# Patient Record
Sex: MALE | Race: WHITE | NOT HISPANIC OR LATINO | Employment: FULL TIME | URBAN - METROPOLITAN AREA
[De-identification: names, ages, dates, MRNs, and addresses within clinical notes are randomized per-mention and may not be internally consistent; named-entity substitution may affect disease eponyms.]

---

## 2019-02-20 ENCOUNTER — HOSPITAL ENCOUNTER (OUTPATIENT)
Dept: MRI IMAGING | Facility: HOSPITAL | Age: 54
Discharge: HOME | End: 2019-02-20

## 2019-02-22 ENCOUNTER — HOSPITAL ENCOUNTER (OUTPATIENT)
Dept: OTHER | Facility: HOSPITAL | Age: 54
Discharge: HOME | End: 2019-02-22

## 2019-09-03 ENCOUNTER — OFFICE VISIT CONVERTED (OUTPATIENT)
Dept: ORTHOPEDIC SURGERY | Facility: CLINIC | Age: 54
End: 2019-09-03
Attending: ORTHOPAEDIC SURGERY

## 2021-05-15 VITALS — BODY MASS INDEX: 35.47 KG/M2 | HEIGHT: 75 IN | WEIGHT: 285.25 LBS | HEART RATE: 77 BPM | OXYGEN SATURATION: 98 %

## 2022-07-20 PROBLEM — R07.2 CHEST PAIN, PRECORDIAL: Status: ACTIVE | Noted: 2022-07-20

## 2022-07-20 NOTE — PROGRESS NOTES
"Chief Complaint  Chest Pain (Left arm going numb - getting worse last couple of weeks)      History of Present Illness  Medhat Stevens presents to [facility] CARDIOLOGY  Patient is a 57-year-old who is developed some left-sided chest pain in the last 6 months a lot of times occurring with laying in his recliner the pain is associate with some left arm numbness.  The patient does report worsening with inspiration sometimes with these episodes the pain is sharp in quality.  Patient denies any change in his exercise capacity at work or change in his functional status.  The patient reports about 8-10 episodes of chest pain in the last 6 months.  He denies any PND orthopnea lower extremity edema.  History reviewed. No pertinent past medical history.      Current Outpatient Medications:   •  fentaNYL (DURAGESIC) 25 MCG/HR patch, APPLY 1 PATCH TOPICALLY TO THE SKIN EVERY 48 HOURS, Disp: , Rfl:     There are no discontinued medications.  No Known Allergies     Social History     Tobacco Use   • Smoking status: Never Smoker   • Smokeless tobacco: Never Used   Vaping Use   • Vaping Use: Never used   Substance Use Topics   • Alcohol use: Not Currently     Comment: Years ago   • Drug use: Never       History reviewed. No pertinent family history.     Objective     /80   Pulse 70   Ht 190.5 cm (75\")   Wt 129 kg (285 lb)   BMI 35.62 kg/m²       Physical Exam    General Appearance:   · no acute distress  · Alert and oriented x3  HENT:   · lips not cyanotic  · Atraumatic  Neck:  · No jvd   · supple  Respiratory:  · no respiratory distress  · normal breath sounds  · no rales  Cardiovascular:  · Regular rate and rhythm  · no S3, no S4   · no murmur  · no rub  Extremities  · No cyanosis  · lower extremity edema: none    Skin:   · warm, dry  · No rashes    Result Review :     No results found for: PROBNP           ECG 12 Lead    Date/Time: 7/26/2022 10:50 AM  Performed by: Jamie Cox MD  Authorized by: " Jamie Cox MD   Comparison: not compared with previous ECG   Rhythm: sinus rhythm  Conduction: right bundle branch block           No results found for this or any previous visit.             The ASCVD Risk score ([place] MCKENZIE Jr., et al., 2013) failed to calculate for the following reasons:    Cannot find a previous HDL lab    Cannot find a previous total cholesterol lab     Diagnoses and all orders for this visit:    1. Chest pain, precordial (Primary)  Assessment & Plan:  Patient with new onset of nonexertional chest discomfort in the last few months differential does include muscle skeletal, pleuritis, or possible CAD given his poor exertional capacity due to his muscle skeletal issues will recommend a noninvasive stress test and echocardiogram if no ischemic issues would favor musculoskeletal cause.  In addition we will check a lipid panel to further assess his baseline risk factors    Orders:  -     Adult Transthoracic Echo Complete W/ Cont if Necessary Per Protocol; Future  -     Stress Test With Myocardial Perfusion One Day; Future  -     Lipid Panel; Future  -     Hepatic Function Panel; Future    Other orders  -     ECG 12 Lead          Follow Up     Return in about 6 months (around 1/26/2023).          Patient was given instructions and counseling regarding his condition or for health maintenance advice. Please see specific information pulled into the AVS if appropriate.

## 2022-07-26 ENCOUNTER — PATIENT ROUNDING (BHMG ONLY) (OUTPATIENT)
Dept: CARDIOLOGY | Facility: CLINIC | Age: 57
End: 2022-07-26

## 2022-07-26 ENCOUNTER — OFFICE VISIT (OUTPATIENT)
Dept: CARDIOLOGY | Facility: CLINIC | Age: 57
End: 2022-07-26

## 2022-07-26 VITALS
HEART RATE: 70 BPM | WEIGHT: 285 LBS | DIASTOLIC BLOOD PRESSURE: 80 MMHG | BODY MASS INDEX: 35.43 KG/M2 | SYSTOLIC BLOOD PRESSURE: 134 MMHG | HEIGHT: 75 IN

## 2022-07-26 DIAGNOSIS — R07.2 CHEST PAIN, PRECORDIAL: Primary | ICD-10-CM

## 2022-07-26 PROCEDURE — 99203 OFFICE O/P NEW LOW 30 MIN: CPT | Performed by: INTERNAL MEDICINE

## 2022-07-26 PROCEDURE — 93000 ELECTROCARDIOGRAM COMPLETE: CPT | Performed by: INTERNAL MEDICINE

## 2022-07-26 RX ORDER — FENTANYL 25 UG/H
PATCH TRANSDERMAL
COMMUNITY
Start: 2022-07-05

## 2022-07-26 NOTE — ASSESSMENT & PLAN NOTE
Patient with new onset of nonexertional chest discomfort in the last few months differential does include muscle skeletal, pleuritis, or possible CAD given his poor exertional capacity due to his muscle skeletal issues will recommend a noninvasive stress test and echocardiogram if no ischemic issues would favor musculoskeletal cause.  In addition we will check a lipid panel to further assess his baseline risk factors

## 2022-07-26 NOTE — PROGRESS NOTES
July 26, 2022    Hello, may I speak with Medhat Stevens?    My name is DARIELA       I am  with [facility] CARDIOLOG[facility]  [address] [place] [address] [phone].    Before we get started may I verify your date of birth? 1965    I am calling to officially welcome you to our practice and ask about your recent visit. Is this a good time to talk? yes    Tell me about your visit with us. What things went well?  EVERYTHING WENT WELL, EVERYTHING I EXPECTED TO HEAR IS WHAT I HEARD.        We're always looking for ways to make our patients' experiences even better. Do you have recommendations on ways we may improve?  yes, GET RID OF THE MASKS     Overall were you satisfied with your first visit to our practice? yes       I appreciate you taking the time to speak with me today. Is there anything else I can do for you? no      Thank you, and have a great day.

## 2022-07-29 ENCOUNTER — TELEPHONE (OUTPATIENT)
Dept: CARDIOLOGY | Facility: CLINIC | Age: 57
End: 2022-07-29

## 2022-08-25 ENCOUNTER — APPOINTMENT (OUTPATIENT)
Dept: CARDIOLOGY | Facility: HOSPITAL | Age: 57
End: 2022-08-25

## 2022-08-25 ENCOUNTER — APPOINTMENT (OUTPATIENT)
Dept: NUCLEAR MEDICINE | Facility: HOSPITAL | Age: 57
End: 2022-08-25

## 2023-05-24 NOTE — PROGRESS NOTES
Chief Complaint  Rectal bleeding and hematochezia    Medhat Stevens is a 58 y.o. male who presents to [facility] [place] on referral from Bryan Shafer MD for a gastroenterology evaluation of rectal bleeding and hematochezia       History of Present Illness  New patient presents to the office for rectal discharge, blood in stool, and GERD. He previously struggled with a lot of constipation resulting in blood in stool after straining. He has discontinued fentanyl patches and constipation has greatly improved. He has struggled with clear/mucus rectal discharge for several years, this improved when increasing water intake. He has heartburn daily, reports taking TUMS 5x/day. Denies nausea, vomiting, epigastric pain, and dysphagia.     Colonoscopy 04/18/2018 by Dr. Ji - small hyperplastic polyp in sigmoid colon and internal hemorrhoids. Repeat colonoscopy in 10 years     Past Medical History:   Diagnosis Date   • GERD (gastroesophageal reflux disease) 2014   • Irritable bowel syndrome 2020       Past Surgical History:   Procedure Laterality Date   • APPENDECTOMY  1982   • COLONOSCOPY  2018         Current Outpatient Medications:   •  HYDROcodone Bit-Homatrop MBr (HYCODAN) 5-1.5 MG tablet tablet, Take 1 tablet by mouth Every 6 (Six) Hours As Needed., Disp: , Rfl:   •  HYDROcodone-ibuprofen (VICOPROFEN) 7.5-200 MG per tablet, Take 1 tablet by mouth Every 8 (Eight) Hours As Needed for Moderate Pain., Disp: , Rfl:   •  Vitamin D, Cholecalciferol, (CHOLECALCIFEROL) 10 MCG (400 UNIT) tablet, Take 1 tablet by mouth Daily., Disp: , Rfl:   •  famotidine (Pepcid) 40 MG tablet, Take 1 tablet by mouth every night at bedtime., Disp: 90 tablet, Rfl: 1  •  fentaNYL (DURAGESIC) 25 MCG/HR patch, APPLY 1 PATCH TOPICALLY TO THE SKIN EVERY 48 HOURS, Disp: , Rfl:   •  PEG 3350-KCl-NaBcb-NaCl-NaSulf (Golytely) 227.1 g pack, Take 4,000 mL by mouth 1 (One) Time for 1 dose. Take as directed by the office  "for colon prep, Disp: 1 each, Rfl: 0     No Known Allergies    Family History   Problem Relation Age of Onset   • Colon cancer Neg Hx         Social History     Social History Narrative   • Not on file       Immunization:    There is no immunization history on file for this patient.     Objective     Vital Signs:   /82 (BP Location: Left arm, Patient Position: Sitting, Cuff Size: Adult)   Pulse 91   Ht 190.5 cm (75\")   Wt (!) 144 kg (316 lb 6.4 oz)   SpO2 94%   BMI 39.55 kg/m²       Physical Exam  Constitutional:       Appearance: Normal appearance.   HENT:      Head: Normocephalic.   Cardiovascular:      Rate and Rhythm: Normal rate and regular rhythm.      Heart sounds: Normal heart sounds.   Pulmonary:      Effort: Pulmonary effort is normal.      Breath sounds: Normal breath sounds.   Abdominal:      General: Bowel sounds are normal.      Palpations: Abdomen is soft.   Skin:     General: Skin is warm and dry.   Neurological:      Mental Status: He is alert and oriented to person, place, and time. Mental status is at baseline.   Psychiatric:         Mood and Affect: Mood normal.         Behavior: Behavior normal.         Thought Content: Thought content normal.         Judgment: Judgment normal.         Result Review :                 Assessment and Plan    Diagnoses and all orders for this visit:    1. Rectal discharge (Primary)    2. History of colon polyps    3. Blood in stool    4. Gastroesophageal reflux disease, unspecified whether esophagitis present    Other orders  -     famotidine (Pepcid) 40 MG tablet; Take 1 tablet by mouth every night at bedtime.  Dispense: 90 tablet; Refill: 1  -     PEG 3350-KCl-NaBcb-NaCl-NaSulf (Golytely) 227.1 g pack; Take 4,000 mL by mouth 1 (One) Time for 1 dose. Take as directed by the office for colon prep  Dispense: 1 each; Refill: 0    Add daily fiber supplement and increase water intake.  Start Pepcid 40mg daily.  Denies cardiopulmonary history. "   EGD/COLONOSCOPY Surgical Risk and Benefits: Possible risk/complications, benefits, and alternatives to surgical or invasive procedure have been explained to patient and/or legal guardian. Risks include bleeding, infection, and perforation. Patient has been evaluated and can tolerate anesthesia and/or sedation. Risk, benefits, and alternatives to anesthesia and sedation have been explained to patient and/or legal guardian.     Follow Up   No follow-ups on file.  Patient was given instructions and counseling regarding his condition or for health maintenance advice. Please see specific information pulled into the AVS if appropriate.

## 2023-05-25 ENCOUNTER — OFFICE VISIT (OUTPATIENT)
Dept: GASTROENTEROLOGY | Facility: CLINIC | Age: 58
End: 2023-05-25
Payer: COMMERCIAL

## 2023-05-25 ENCOUNTER — PREP FOR SURGERY (OUTPATIENT)
Dept: OTHER | Facility: HOSPITAL | Age: 58
End: 2023-05-25
Payer: COMMERCIAL

## 2023-05-25 VITALS
DIASTOLIC BLOOD PRESSURE: 82 MMHG | WEIGHT: 315 LBS | SYSTOLIC BLOOD PRESSURE: 147 MMHG | HEIGHT: 75 IN | HEART RATE: 91 BPM | OXYGEN SATURATION: 94 % | BODY MASS INDEX: 39.17 KG/M2

## 2023-05-25 DIAGNOSIS — R19.8 RECTAL DISCHARGE: Primary | ICD-10-CM

## 2023-05-25 DIAGNOSIS — Z86.010 HISTORY OF COLON POLYPS: ICD-10-CM

## 2023-05-25 DIAGNOSIS — K92.1 BLOOD IN STOOL: ICD-10-CM

## 2023-05-25 DIAGNOSIS — R07.2 CHEST PAIN, PRECORDIAL: ICD-10-CM

## 2023-05-25 DIAGNOSIS — K21.9 GASTROESOPHAGEAL REFLUX DISEASE, UNSPECIFIED WHETHER ESOPHAGITIS PRESENT: ICD-10-CM

## 2023-05-25 PROBLEM — Z86.0100 HISTORY OF COLON POLYPS: Status: ACTIVE | Noted: 2023-05-25

## 2023-05-25 RX ORDER — ERGOCALCIFEROL (VITAMIN D2) 10 MCG
400 TABLET ORAL DAILY
COMMUNITY

## 2023-05-25 RX ORDER — POLYETHYLENE GLYCOL 3350, SODIUM SULFATE ANHYDROUS, SODIUM BICARBONATE, SODIUM CHLORIDE, POTASSIUM CHLORIDE 227.1; 21.5; 6.36; 5.53; .754 G/L; G/L; G/L; G/L; G/L
4000 POWDER, FOR SOLUTION ORAL ONCE
Qty: 1 EACH | Refills: 0 | Status: SHIPPED | OUTPATIENT
Start: 2023-05-25 | End: 2023-05-25

## 2023-05-25 RX ORDER — FAMOTIDINE 40 MG/1
40 TABLET, FILM COATED ORAL
Qty: 90 TABLET | Refills: 1 | Status: SHIPPED | OUTPATIENT
Start: 2023-05-25

## 2023-05-25 RX ORDER — HYDROCODONE BITARTRATE AND IBUPROFEN 7.5; 2 MG/1; MG/1
1 TABLET, FILM COATED ORAL EVERY 8 HOURS PRN
COMMUNITY

## 2023-05-25 RX ORDER — HYDROCODONE BITARTRATE AND HOMATROPINE METHYLBROMIDE 5; 1.5 MG/1; MG/1
1 TABLET ORAL EVERY 6 HOURS PRN
COMMUNITY

## 2023-08-14 ENCOUNTER — TELEPHONE (OUTPATIENT)
Dept: GASTROENTEROLOGY | Facility: CLINIC | Age: 58
End: 2023-08-14
Payer: COMMERCIAL

## 2023-08-14 NOTE — TELEPHONE ENCOUNTER
Caller: FARHAD CORNEJO    Relationship: SELF    Best call back number: [phone]    What form or medical record are you requesting: NOTE FOR WORK    Who is requesting this form or medical record from you: WORK/SELF    How would you like to receive the form or medical records (pick-up, mail, fax):     Timeframe paperwork needed: AFTER PROCEDURE 08/16/23    Additional notes: PATIENT ASKED FOR A NOTE FOR HIS WORK BECAUSE HE WILL BE OUT THE NEXT TWO DAYS FOR PREP AND PROCEDURE. HE'S JUST REQUESTING TO RECEIVE THIS ON THE 16TH

## 2023-08-16 ENCOUNTER — TELEPHONE (OUTPATIENT)
Dept: GASTROENTEROLOGY | Facility: CLINIC | Age: 58
End: 2023-08-16
Payer: COMMERCIAL

## 2023-08-16 NOTE — TELEPHONE ENCOUNTER
Regarding egd/colonoscopy on 8/16/2023 ordered by JAY Rubi on 5/25/2023    Per email from ENDO- pt no showed

## 2023-08-17 NOTE — TELEPHONE ENCOUNTER
Called pt to see if I could get him rescheduled. No answer. Left message for pt to call back.     EGD/Colonoscopy

## 2023-08-17 NOTE — TELEPHONE ENCOUNTER
Regular and Certified letters sent to patient.     CERTIFIED LETTER NUMBER: [phone]

## 2023-10-27 ENCOUNTER — OFFICE VISIT (OUTPATIENT)
Dept: GASTROENTEROLOGY | Facility: CLINIC | Age: 58
End: 2023-10-27
Payer: COMMERCIAL

## 2023-10-27 VITALS
DIASTOLIC BLOOD PRESSURE: 86 MMHG | SYSTOLIC BLOOD PRESSURE: 154 MMHG | BODY MASS INDEX: 37.8 KG/M2 | HEART RATE: 88 BPM | WEIGHT: 304 LBS | OXYGEN SATURATION: 100 % | HEIGHT: 75 IN

## 2023-10-27 DIAGNOSIS — R63.4 WEIGHT LOSS: ICD-10-CM

## 2023-10-27 DIAGNOSIS — K62.5 RECTAL BLEEDING: Primary | ICD-10-CM

## 2023-10-27 RX ORDER — HYDROCODONE BITARTRATE AND ACETAMINOPHEN 10; 325 MG/1; MG/1
TABLET ORAL
COMMUNITY
Start: 2023-10-17

## 2023-10-27 RX ORDER — BUTALBITAL, ACETAMINOPHEN AND CAFFEINE 50; 325; 40 MG/1; MG/1; MG/1
TABLET ORAL
COMMUNITY
Start: 2023-09-19

## 2023-10-27 RX ORDER — PRAMOXINE HYDROCHLORIDE HYDROCORTISONE ACETATE 100; 100 MG/10G; MG/10G
1 AEROSOL, FOAM TOPICAL 2 TIMES DAILY
Qty: 15 G | Refills: 1 | Status: SHIPPED | OUTPATIENT
Start: 2023-10-27

## 2023-10-27 NOTE — PROGRESS NOTES
Chief Complaint  Colon cancer screening     Medhat Stevens is a 58 y.o. male who presents to [facility]- [place] for colon cancer screening     History of present Illness  Last office visit 5/25/23 - struggling with rectal discharge and blood ins stool. Managing heartburn with TUMS daily. Patient started on Pepcid 40mg daily. Advised to take daily fiber supplement.  EGD/colonoscopy scheduled but ultimately no showed procedure.   Colonoscopy 04/18/2018 by Dr. Ji - small hyperplastic polyp in sigmoid colon and internal hemorrhoids. Repeat colonoscopy in 10 years     Patient presents to the office to discuss colonoscopy. Patient was notified of high cost if proceeding with EGD and colonoscopy together. Heartburn is well controlled with Pepcid 40mg daily. Denies breakthrough heartburn, nausea, vomiting, epigastric pain, and dysphagia. He has a bowel movement once daily or every other day. He is having increase in rectal bleeding, has noticed clots. Patient has rectal bleeding for 30min to 1 hour after procedure. Patient has had 12 pound weight loss since last office visit.     Past Medical History:   Diagnosis Date    GERD (gastroesophageal reflux disease) 2014    Irritable bowel syndrome 2020       Past Surgical History:   Procedure Laterality Date    APPENDECTOMY  1982    COLONOSCOPY  2018         Current Outpatient Medications:     butalbital-acetaminophen-caffeine (FIORICET, ESGIC) -40 MG per tablet, Every 4 (Four) Hours., Disp: , Rfl:     famotidine (Pepcid) 40 MG tablet, Take 1 tablet by mouth every night at bedtime., Disp: 90 tablet, Rfl: 1    HYDROcodone Bit-Homatrop MBr (HYCODAN) 5-1.5 MG tablet tablet, Take 1 tablet by mouth Every 6 (Six) Hours As Needed., Disp: , Rfl:     HYDROcodone-acetaminophen (NORCO)  MG per tablet, 1 tab(s) orally every 8  hours for 28 days, Disp: , Rfl:     HYDROcodone-ibuprofen (VICOPROFEN) 7.5-200 MG per tablet, Take 1 tablet by  "mouth Every 8 (Eight) Hours As Needed for Moderate Pain., Disp: , Rfl:     Vitamin D, Cholecalciferol, (CHOLECALCIFEROL) 10 MCG (400 UNIT) tablet, Take 1 tablet by mouth Daily., Disp: , Rfl:     fentaNYL (DURAGESIC) 25 MCG/HR patch, APPLY 1 PATCH TOPICALLY TO THE SKIN EVERY 48 HOURS, Disp: , Rfl:     Hydrocort-Pramoxine, Perianal, (Proctofoam HC) 1-1 % rectal foam, Insert 1 application  into the rectum 2 (Two) Times a Day., Disp: 15 g, Rfl: 1     No Known Allergies    Family History   Problem Relation Age of Onset    Colon cancer Neg Hx         Social History     Social History Narrative    Not on file       Objective       Vital Signs:   /86 (BP Location: Left arm, Patient Position: Sitting, Cuff Size: Adult)   Pulse 88   Ht 190.5 cm (75\")   Wt (!) 138 kg (304 lb)   SpO2 100%   BMI 38.00 kg/m²       Physical Exam  Constitutional:       Appearance: Normal appearance.   HENT:      Head: Normocephalic.   Cardiovascular:      Rate and Rhythm: Normal rate and regular rhythm.      Heart sounds: Normal heart sounds.   Pulmonary:      Effort: Pulmonary effort is normal.      Breath sounds: Normal breath sounds.   Abdominal:      General: Bowel sounds are normal.      Palpations: Abdomen is soft.   Skin:     General: Skin is warm and dry.   Neurological:      Mental Status: He is alert and oriented to person, place, and time. Mental status is at baseline.   Psychiatric:         Mood and Affect: Mood normal.         Behavior: Behavior normal.         Thought Content: Thought content normal.         Judgment: Judgment normal.         Result Review :                   Assessment and Plan    Diagnoses and all orders for this visit:    1. Rectal bleeding (Primary)  -     CBC & Differential; Future  -     Case Request; Standing  -     Follow Anesthesia Guidelines / Protocol; Standing  -     Obtain Informed Consent; Standing  -     Verify NPO; Standing  -     Case Request    2. Weight loss  -     Case Request; " Standing  -     Follow Anesthesia Guidelines / Protocol; Standing  -     Obtain Informed Consent; Standing  -     Verify NPO; Standing  -     Case Request    Other orders  -     Hydrocort-Pramoxine, Perianal, (Proctofoam HC) 1-1 % rectal foam; Insert 1 application  into the rectum 2 (Two) Times a Day.  Dispense: 15 g; Refill: 1    Patient does not wish to proceed with EGD due to out of pocket cost, understands risk associated with deferring.  CBC to assess anemia  Proctofoam prescribed  Patient has 4L prep from previously ordered procedure  Denies cardiopulmonary history.  COLONOSCOPY Surgical Risk and Benefits: Possible risk/complications, benefits, and alternatives to surgical or invasive procedure have been explained to patient and/or legal guardian. Risks include bleeding, infection, and perforation. Patient has been evaluated and can tolerate anesthesia and/or sedation. Risk, benefits, and alternatives to anesthesia and sedation have been explained to patient and/or legal guardian.     Follow Up   No follow-ups on file.  Patient was given instructions and counseling regarding his condition or for health maintenance advice. Please see specific information pulled into the AVS if appropriate.

## 2023-10-30 ENCOUNTER — LAB (OUTPATIENT)
Dept: LAB | Facility: HOSPITAL | Age: 58
End: 2023-10-30
Payer: COMMERCIAL

## 2023-10-30 DIAGNOSIS — K62.5 RECTAL BLEEDING: ICD-10-CM

## 2023-10-30 LAB
BASOPHILS # BLD AUTO: 0.06 10*3/MM3 (ref 0–0.2)
BASOPHILS NFR BLD AUTO: 1 % (ref 0–1.5)
DEPRECATED RDW RBC AUTO: 41.9 FL (ref 37–54)
EOSINOPHIL # BLD AUTO: 0.18 10*3/MM3 (ref 0–0.4)
EOSINOPHIL NFR BLD AUTO: 3 % (ref 0.3–6.2)
ERYTHROCYTE [DISTWIDTH] IN BLOOD BY AUTOMATED COUNT: 13 % (ref 12.3–15.4)
HCT VFR BLD AUTO: 35.9 % (ref 37.5–51)
HGB BLD-MCNC: 12.1 G/DL (ref 13–17.7)
IMM GRANULOCYTES # BLD AUTO: 0.02 10*3/MM3 (ref 0–0.05)
IMM GRANULOCYTES NFR BLD AUTO: 0.3 % (ref 0–0.5)
LYMPHOCYTES # BLD AUTO: 1.38 10*3/MM3 (ref 0.7–3.1)
LYMPHOCYTES NFR BLD AUTO: 22.8 % (ref 19.6–45.3)
MCH RBC QN AUTO: 30 PG (ref 26.6–33)
MCHC RBC AUTO-ENTMCNC: 33.7 G/DL (ref 31.5–35.7)
MCV RBC AUTO: 89.1 FL (ref 79–97)
MONOCYTES # BLD AUTO: 0.44 10*3/MM3 (ref 0.1–0.9)
MONOCYTES NFR BLD AUTO: 7.3 % (ref 5–12)
NEUTROPHILS NFR BLD AUTO: 3.96 10*3/MM3 (ref 1.7–7)
NEUTROPHILS NFR BLD AUTO: 65.6 % (ref 42.7–76)
NRBC BLD AUTO-RTO: 0 /100 WBC (ref 0–0.2)
PLATELET # BLD AUTO: 214 10*3/MM3 (ref 140–450)
PMV BLD AUTO: 12.1 FL (ref 6–12)
RBC # BLD AUTO: 4.03 10*6/MM3 (ref 4.14–5.8)
WBC NRBC COR # BLD: 6.04 10*3/MM3 (ref 3.4–10.8)

## 2023-10-30 PROCEDURE — 36415 COLL VENOUS BLD VENIPUNCTURE: CPT

## 2023-10-30 PROCEDURE — 85025 COMPLETE CBC W/AUTO DIFF WBC: CPT

## 2023-11-01 ENCOUNTER — ANESTHESIA EVENT (OUTPATIENT)
Dept: GASTROENTEROLOGY | Facility: HOSPITAL | Age: 58
End: 2023-11-01
Payer: COMMERCIAL

## 2023-11-01 NOTE — ANESTHESIA PREPROCEDURE EVALUATION
Anesthesia Evaluation     NPO Solid Status: > 8 hours  NPO Liquid Status: > 4 hours           Airway   Mallampati: II  TM distance: >3 FB  Neck ROM: limited  Possible difficult intubation  Dental - normal exam     Pulmonary - normal exam   Cardiovascular - normal exam  Exercise tolerance: good (4-7 METS)        Neuro/Psych  GI/Hepatic/Renal/Endo    (+) GERD, GI bleeding     Musculoskeletal     Abdominal    Substance History      OB/GYN          Other   arthritis,       Other Comment: Chronic opioid use for neck/joint pain (hydrocodone, fentanyl patch)        Phys Exam Other: Hx cervical fusion, very limited neck extension, airway exam otherwise reassuring            Anesthesia Plan    ASA 2     general   total IV anesthesia    Anesthetic plan, risks, benefits, and alternatives have been provided, discussed and informed consent has been obtained with: patient and other.  Pre-procedure education provided  Plan discussed with CRNA.    CODE STATUS:

## 2023-11-02 ENCOUNTER — ANESTHESIA (OUTPATIENT)
Dept: GASTROENTEROLOGY | Facility: HOSPITAL | Age: 58
End: 2023-11-02
Payer: COMMERCIAL

## 2023-11-02 ENCOUNTER — HOSPITAL ENCOUNTER (OUTPATIENT)
Facility: HOSPITAL | Age: 58
Setting detail: HOSPITAL OUTPATIENT SURGERY
Discharge: HOME | End: 2023-11-02
Attending: INTERNAL MEDICINE | Admitting: INTERNAL MEDICINE
Payer: COMMERCIAL

## 2023-11-02 VITALS
TEMPERATURE: 98 F | WEIGHT: 297.62 LBS | SYSTOLIC BLOOD PRESSURE: 162 MMHG | RESPIRATION RATE: 16 BRPM | HEIGHT: 75 IN | DIASTOLIC BLOOD PRESSURE: 90 MMHG | HEART RATE: 88 BPM | BODY MASS INDEX: 37.01 KG/M2 | OXYGEN SATURATION: 96 %

## 2023-11-02 DIAGNOSIS — K62.5 RECTAL BLEEDING: ICD-10-CM

## 2023-11-02 DIAGNOSIS — R63.4 WEIGHT LOSS: ICD-10-CM

## 2023-11-02 PROCEDURE — 25010000002 PROPOFOL 10 MG/ML EMULSION: Performed by: NURSE ANESTHETIST, CERTIFIED REGISTERED

## 2023-11-02 PROCEDURE — 45385 COLONOSCOPY W/LESION REMOVAL: CPT | Performed by: INTERNAL MEDICINE

## 2023-11-02 PROCEDURE — 25810000003 LACTATED RINGERS PER 1000 ML: Performed by: ANESTHESIOLOGY

## 2023-11-02 PROCEDURE — 88305 TISSUE EXAM BY PATHOLOGIST: CPT | Performed by: INTERNAL MEDICINE

## 2023-11-02 RX ORDER — MESALAMINE 1000 MG/1
1000 SUPPOSITORY RECTAL NIGHTLY
Qty: 30 SUPPOSITORY | Refills: 0 | Status: SHIPPED | OUTPATIENT
Start: 2023-11-02 | End: 2023-12-14

## 2023-11-02 RX ORDER — SODIUM CHLORIDE, SODIUM LACTATE, POTASSIUM CHLORIDE, CALCIUM CHLORIDE 600; 310; 30; 20 MG/100ML; MG/100ML; MG/100ML; MG/100ML
30 INJECTION, SOLUTION INTRAVENOUS CONTINUOUS
Status: DISCONTINUED | OUTPATIENT
Start: 2023-11-02 | End: 2023-11-02 | Stop reason: HOSPADM

## 2023-11-02 RX ORDER — LIDOCAINE HYDROCHLORIDE 20 MG/ML
INJECTION, SOLUTION EPIDURAL; INFILTRATION; INTRACAUDAL; PERINEURAL AS NEEDED
Status: DISCONTINUED | OUTPATIENT
Start: 2023-11-02 | End: 2023-11-02 | Stop reason: SURG

## 2023-11-02 RX ORDER — PROPOFOL 10 MG/ML
VIAL (ML) INTRAVENOUS AS NEEDED
Status: DISCONTINUED | OUTPATIENT
Start: 2023-11-02 | End: 2023-11-02 | Stop reason: SURG

## 2023-11-02 RX ADMIN — SODIUM CHLORIDE, POTASSIUM CHLORIDE, SODIUM LACTATE AND CALCIUM CHLORIDE 30 ML/HR: 600; 310; 30; 20 INJECTION, SOLUTION INTRAVENOUS at 08:56

## 2023-11-02 RX ADMIN — LIDOCAINE HYDROCHLORIDE 100 MG: 20 INJECTION, SOLUTION EPIDURAL; INFILTRATION; INTRACAUDAL; PERINEURAL at 09:26

## 2023-11-02 RX ADMIN — PROPOFOL 200 MCG/KG/MIN: 10 INJECTION, EMULSION INTRAVENOUS at 09:26

## 2023-11-02 RX ADMIN — PROPOFOL 50 MG: 10 INJECTION, EMULSION INTRAVENOUS at 09:26

## 2023-11-02 NOTE — H&P
Pre Procedure History & Physical    Chief Complaint:   Rectal bleeding    Subjective     HPI:   Rectal bleeding    Past Medical History:   Past Medical History:   Diagnosis Date    GERD (gastroesophageal reflux disease) 2014    Irritable bowel syndrome 2020    Rectal bleeding 10/27/2023       Past Surgical History:  Past Surgical History:   Procedure Laterality Date    APPENDECTOMY  1982    COLONOSCOPY  2018       Family History:  Family History   Problem Relation Age of Onset    Colon cancer Neg Hx        Social History:   reports that he has never smoked. He has never been exposed to tobacco smoke. He has never used smokeless tobacco. He reports current alcohol use. He reports that he does not use drugs.    Medications:   Medications Prior to Admission   Medication Sig Dispense Refill Last Dose    butalbital-acetaminophen-caffeine (FIORICET, ESGIC) -40 MG per tablet Every 4 (Four) Hours.       famotidine (Pepcid) 40 MG tablet Take 1 tablet by mouth every night at bedtime. 90 tablet 1     fentaNYL (DURAGESIC) 25 MCG/HR patch APPLY 1 PATCH TOPICALLY TO THE SKIN EVERY 48 HOURS       HYDROcodone Bit-Homatrop MBr (HYCODAN) 5-1.5 MG tablet tablet Take 1 tablet by mouth Every 6 (Six) Hours As Needed.       HYDROcodone-acetaminophen (NORCO)  MG per tablet 1 tab(s) orally every 8  hours for 28 days       HYDROcodone-ibuprofen (VICOPROFEN) 7.5-200 MG per tablet Take 1 tablet by mouth Every 8 (Eight) Hours As Needed for Moderate Pain.       Hydrocort-Pramoxine, Perianal, (Proctofoam HC) 1-1 % rectal foam Insert 1 application  into the rectum 2 (Two) Times a Day. 15 g 1     Vitamin D, Cholecalciferol, (CHOLECALCIFEROL) 10 MCG (400 UNIT) tablet Take 1 tablet by mouth Daily.          Allergies:  Patient has no known allergies.        Objective     Weight 135 kg (297 lb 9.9 oz).    Physical Exam   Constitutional: Pt is oriented to person, place, and time and well-developed, well-nourished, and in no distress.    Mouth/Throat: Oropharynx is clear and moist.   Neck: Normal range of motion.   Cardiovascular: Normal rate, regular rhythm and normal heart sounds.    Pulmonary/Chest: Effort normal and breath sounds normal.   Abdominal: Soft. Nontender  Skin: Skin is warm and dry.   Psychiatric: Mood, memory, affect and judgment normal.     Assessment & Plan     Diagnosis:  Rectal bleeding    Anticipated Surgical Procedure:  Colonoscopy    The risks, benefits, and alternatives of this procedure have been discussed with the patient or the responsible party- the patient understands and agrees to proceed.

## 2023-11-02 NOTE — ANESTHESIA POSTPROCEDURE EVALUATION
Patient: Medhat Stevens    Procedure Summary       Date: 11/02/23 Room / Location: [facility] ENDOSCOPY 4 / [facility] ENDOSCOPY    Anesthesia Start: 0925 Anesthesia Stop: 0958    Procedure: COLONOSCOPY WITH POLYPECTOMY Diagnosis:       Rectal bleeding      Weight loss      (Rectal bleeding [K62.5])      (Weight loss [R63.4])    Surgeons: Bryan Ji MD Provider: Uma Maurice CRNA    Anesthesia Type: general ASA Status: 2            Anesthesia Type: general    Vitals  Vitals Value Taken Time   /90 11/02/23 1013   Temp 36.7 °C (98 °F) 11/02/23 1013   Pulse 88 11/02/23 1013   Resp 16 11/02/23 1013   SpO2 96 % 11/02/23 1013           Post Anesthesia Care and Evaluation    Patient location during evaluation: bedside  Patient participation: complete - patient participated  Level of consciousness: awake  Pain management: adequate    Airway patency: patent  Anesthetic complications: No anesthetic complications  PONV Status: controlled  Cardiovascular status: acceptable and stable  Respiratory status: acceptable

## 2023-11-03 DIAGNOSIS — R63.4 WEIGHT LOSS: Primary | ICD-10-CM

## 2023-11-03 LAB
CYTO UR: NORMAL
LAB AP CASE REPORT: NORMAL
LAB AP CLINICAL INFORMATION: NORMAL
PATH REPORT.FINAL DX SPEC: NORMAL
PATH REPORT.GROSS SPEC: NORMAL

## 2024-01-19 ENCOUNTER — OFFICE VISIT (OUTPATIENT)
Dept: SURGERY | Facility: CLINIC | Age: 59
End: 2024-01-19
Payer: COMMERCIAL

## 2024-01-19 ENCOUNTER — PREP FOR SURGERY (OUTPATIENT)
Dept: OTHER | Facility: HOSPITAL | Age: 59
End: 2024-01-19
Payer: COMMERCIAL

## 2024-01-19 VITALS — RESPIRATION RATE: 16 BRPM | BODY MASS INDEX: 39.17 KG/M2 | HEIGHT: 75 IN | WEIGHT: 315 LBS

## 2024-01-19 DIAGNOSIS — K64.9 HEMORRHOIDS, UNSPECIFIED HEMORRHOID TYPE: Primary | ICD-10-CM

## 2024-01-19 RX ORDER — TOPIRAMATE 50 MG/1
TABLET, FILM COATED ORAL EVERY 12 HOURS SCHEDULED
COMMUNITY
Start: 2023-11-14

## 2024-01-19 RX ORDER — TOPIRAMATE 25 MG/1
TABLET ORAL
COMMUNITY
Start: 2023-11-14

## 2024-01-19 NOTE — PROGRESS NOTES
Chief Complaint:  Hemorrhoids    Primary Care Provider: Bryan Shafer MD    Referring Provider: Bryan Shafer MD    History of Present Illness  Medhat Stevens is a 58 y.o. male referred by Bryan Shafer MD for hemorrhoids.  Patient says he has had per hemorrhoids past few years.  Patient takes narcotic pain medications for chronic back pain.  He said he is trying to do everything he can to deal with the hemorrhoids but has been unsuccessful.  He has a bleeding on and off and stool seepage.  He said he has been dealing for the hemorrhoids for a long period of time and is hoping something can be done to help improve his problem.  Patient had a colonoscopy time in the past few months and then no concerning findings.    Allergies: Patient has no known allergies.    Outpatient Medications Marked as Taking for the 1/19/24 encounter (Office Visit) with Tesfaye Saez MD   Medication Sig Dispense Refill    butalbital-acetaminophen-caffeine (FIORICET, ESGIC) -40 MG per tablet Every 4 (Four) Hours.      famotidine (Pepcid) 40 MG tablet Take 1 tablet by mouth every night at bedtime. 90 tablet 1    HYDROcodone-acetaminophen (NORCO)  MG per tablet 1 tab(s) orally every 8  hours for 28 days      Hydrocort-Pramoxine, Perianal, (Proctofoam HC) 1-1 % rectal foam Insert 1 application  into the rectum 2 (Two) Times a Day. 15 g 1    topiramate (TOPAMAX) 25 MG tablet       topiramate (TOPAMAX) 50 MG tablet Every 12 (Twelve) Hours.      Vitamin D, Cholecalciferol, (CHOLECALCIFEROL) 10 MCG (400 UNIT) tablet Take 1 tablet by mouth Daily.         Past Medical History:    GERD (gastroesophageal reflux disease)    Hemorrhoids    Irritable bowel syndrome    Rectal bleeding        Past Surgical History:    APPENDECTOMY    COLONOSCOPY    COLONOSCOPY    Procedure: COLONOSCOPY WITH POLYPECTOMY;  Surgeon: Bryan Ji MD;  Location: [facility] ENDOSCOPY;  Service: Gastroenterology;  Laterality: N/A;  COLON POLYP, HEMORRHOIDS  "      Family History:   Family History   Problem Relation Age of Onset    Colon cancer Neg Hx         Social History:  Social History     Tobacco Use    Smoking status: Never     Passive exposure: Never    Smokeless tobacco: Never   Substance Use Topics    Alcohol use: Yes     Comment: 1 week       Objective     Vital Signs:  Resp 16   Ht 190.5 cm (75\")   Wt (!) 144 kg (317 lb)   BMI 39.62 kg/m²   Respiratory:  breathing not labored, respiratory effort appears normal  Cardiovascular:  heart regular rate  Musculoskeletal: moving all extremities symmetrically and purposefully  Neurologic:  no obvious motor or sensory deficits, alert & oriented x 3, speech clear  Psychiatric:  judgment and insight intact  Anal: Several areas of redundant anal skin.  Combination internal and external hemorrhoids almost completely circumferential.      Assessment:  Hemorrhoids    Plan:  Surgical hemorrhoidectomy    Discussion: Indications, options, risks, benefits, and expected outcomes of planned surgery were discussed with the patient and he agrees to proceed.    Tesfaye Saez MD  01/19/2024    Electronically signed by Tesfaye Saez MD, 01/19/24, 3:18 PM EST.      "

## 2024-01-25 RX ORDER — FAMOTIDINE 40 MG/1
40 TABLET, FILM COATED ORAL
Qty: 90 TABLET | Refills: 2 | Status: SHIPPED | OUTPATIENT
Start: 2024-01-25

## 2024-02-21 ENCOUNTER — ANESTHESIA EVENT (OUTPATIENT)
Dept: PERIOP | Facility: HOSPITAL | Age: 59
End: 2024-02-21
Payer: COMMERCIAL

## 2024-02-21 NOTE — PRE-PROCEDURE INSTRUCTIONS
IMPORTANT INSTRUCTIONS - PRE-ADMISSION TESTING  DO NOT EAT OR CHEW anything after midnight the night before your procedure.    You may have CLEAR liquids up to _2__ hours prior to ARRIVAL time.   Take the following medications the morning of your procedure with JUST A SIP OF WATER:  Hydrocodone, topamax _______________  DO NOT BRING your medications to the hospital with you, UNLESS something has changed since your PRE-Admission Testing appointment.  Hold all vitamins, supplements, and NSAIDS (Non- steroidal anti-inflammatory meds) for one week prior to surgery (you MAY take Tylenol or Acetaminophen).  If you are diabetic, check your blood sugar the morning of your procedure. If it is less than 70 or if you are feeling symptomatic, call the following number for further instructions: [phone]  Use your inhalers/nebulizers as usual, the morning of your procedure. BRING YOUR INHALERS with you.   Bring your CPAP or BIPAP to hospital, ONLY IF YOU WILL BE SPENDING THE NIGHT.   Make sure you have a ride home and have someone who will stay with you the day of your procedure after you go home.  If you have any questions, please call your Pre-Admission Testing Nurse, _LIZZY __ at [phone]____.   Per anesthesia request, do not smoke for 24 hours before your procedure or as instructed by your surgeon.    Clear Liquid Diet        Find out when you need to start a clear liquid diet.   Think of “clear liquids” as anything you could read a newspaper through. This includes things like water, broth, sports drinks, or tea WITHOUT any kind of milk or cream.           Once you are told to start a clear liquid diet, only drink these things until 2 hours before arrival to the hospital or when the hospital says to stop. Total volume limitation: 8 oz.       Clear liquids you CAN drink:   Water   Clear broth: beef, chicken, vegetable, or bone broth with nothing in it   Gatorade   Lemonade or Pj-aid   Soda   Tea, coffee (NO  cream or honey)   Jell-O (without fruit)   Popsicles (without fruit or cream)   Italian ices   Juice without pulp: apple, white, grape   You may use salt, pepper, and sugar    Do NOT drink:   Milk or cream   Soy milk, almond milk, coconut milk, or other non-dairy drinks and   creamers   Milkshakes or smoothies   Tomato juice   Orange juice   Grapefruit juice   Cream soups or any other than broth         Clear Liquid Diet:  Do NOT eat any solid food.  Do NOT eat or suck on mints or candy.  Do NOT chew gum.  Do NOT drink thick liquids like milk or juice with pulp in it.  Do NOT add milk, cream, or anything like soy milk or almond milk to coffee or tea.

## 2024-02-22 ENCOUNTER — ANESTHESIA (OUTPATIENT)
Dept: PERIOP | Facility: HOSPITAL | Age: 59
End: 2024-02-22
Payer: COMMERCIAL

## 2024-02-22 ENCOUNTER — HOSPITAL ENCOUNTER (OUTPATIENT)
Facility: HOSPITAL | Age: 59
Setting detail: HOSPITAL OUTPATIENT SURGERY
Discharge: HOME | End: 2024-02-22
Attending: SURGERY | Admitting: SURGERY
Payer: COMMERCIAL

## 2024-02-22 VITALS
WEIGHT: 307.1 LBS | OXYGEN SATURATION: 98 % | HEIGHT: 75 IN | SYSTOLIC BLOOD PRESSURE: 149 MMHG | TEMPERATURE: 97 F | RESPIRATION RATE: 16 BRPM | BODY MASS INDEX: 38.18 KG/M2 | HEART RATE: 71 BPM | DIASTOLIC BLOOD PRESSURE: 81 MMHG

## 2024-02-22 DIAGNOSIS — K64.9 HEMORRHOIDS, UNSPECIFIED HEMORRHOID TYPE: ICD-10-CM

## 2024-02-22 PROCEDURE — 93010 ELECTROCARDIOGRAM REPORT: CPT | Performed by: INTERNAL MEDICINE

## 2024-02-22 PROCEDURE — 25010000002 PROPOFOL 200 MG/20ML EMULSION

## 2024-02-22 PROCEDURE — 25010000002 MIDAZOLAM PER 1MG: Performed by: ANESTHESIOLOGY

## 2024-02-22 PROCEDURE — 25010000002 SUGAMMADEX 200 MG/2ML SOLUTION

## 2024-02-22 PROCEDURE — 25810000003 LACTATED RINGERS PER 1000 ML: Performed by: ANESTHESIOLOGY

## 2024-02-22 PROCEDURE — 25010000002 ONDANSETRON PER 1 MG

## 2024-02-22 PROCEDURE — 25010000002 FENTANYL CITRATE (PF) 50 MCG/ML SOLUTION

## 2024-02-22 PROCEDURE — 88304 TISSUE EXAM BY PATHOLOGIST: CPT | Performed by: SURGERY

## 2024-02-22 PROCEDURE — 93005 ELECTROCARDIOGRAM TRACING: CPT | Performed by: SURGERY

## 2024-02-22 PROCEDURE — 25010000002 BUPIVACAINE (PF) 0.25 % SOLUTION: Performed by: SURGERY

## 2024-02-22 PROCEDURE — 25010000002 HYDROMORPHONE 1 MG/ML SOLUTION

## 2024-02-22 PROCEDURE — 46260 REMOVE IN/EX HEM GROUPS 2+: CPT | Performed by: SPECIALIST/TECHNOLOGIST, OTHER

## 2024-02-22 PROCEDURE — 46260 REMOVE IN/EX HEM GROUPS 2+: CPT | Performed by: SURGERY

## 2024-02-22 PROCEDURE — 25010000002 CEFOXITIN PER 1 G: Performed by: SURGERY

## 2024-02-22 PROCEDURE — 25010000002 DEXAMETHASONE PER 1 MG

## 2024-02-22 DEVICE — HEMOST ABS SURGIFOAM 8X3CM: Type: IMPLANTABLE DEVICE | Site: RECTUM | Status: FUNCTIONAL

## 2024-02-22 RX ORDER — PROMETHAZINE HYDROCHLORIDE 25 MG/1
25 SUPPOSITORY RECTAL ONCE AS NEEDED
Status: DISCONTINUED | OUTPATIENT
Start: 2024-02-22 | End: 2024-02-22 | Stop reason: HOSPADM

## 2024-02-22 RX ORDER — PROMETHAZINE HYDROCHLORIDE 12.5 MG/1
25 TABLET ORAL ONCE AS NEEDED
Status: DISCONTINUED | OUTPATIENT
Start: 2024-02-22 | End: 2024-02-22 | Stop reason: HOSPADM

## 2024-02-22 RX ORDER — FENTANYL CITRATE 50 UG/ML
INJECTION, SOLUTION INTRAMUSCULAR; INTRAVENOUS AS NEEDED
Status: DISCONTINUED | OUTPATIENT
Start: 2024-02-22 | End: 2024-02-22 | Stop reason: SURG

## 2024-02-22 RX ORDER — ONDANSETRON 2 MG/ML
4 INJECTION INTRAMUSCULAR; INTRAVENOUS ONCE AS NEEDED
Status: DISCONTINUED | OUTPATIENT
Start: 2024-02-22 | End: 2024-02-22 | Stop reason: HOSPADM

## 2024-02-22 RX ORDER — MIDAZOLAM HYDROCHLORIDE 2 MG/2ML
2 INJECTION, SOLUTION INTRAMUSCULAR; INTRAVENOUS ONCE
Status: COMPLETED | OUTPATIENT
Start: 2024-02-22 | End: 2024-02-22

## 2024-02-22 RX ORDER — ONDANSETRON 2 MG/ML
INJECTION INTRAMUSCULAR; INTRAVENOUS AS NEEDED
Status: DISCONTINUED | OUTPATIENT
Start: 2024-02-22 | End: 2024-02-22 | Stop reason: SURG

## 2024-02-22 RX ORDER — BUPIVACAINE HYDROCHLORIDE 2.5 MG/ML
INJECTION, SOLUTION EPIDURAL; INFILTRATION; INTRACAUDAL AS NEEDED
Status: DISCONTINUED | OUTPATIENT
Start: 2024-02-22 | End: 2024-02-22 | Stop reason: HOSPADM

## 2024-02-22 RX ORDER — DEXAMETHASONE SODIUM PHOSPHATE 4 MG/ML
INJECTION, SOLUTION INTRA-ARTICULAR; INTRALESIONAL; INTRAMUSCULAR; INTRAVENOUS; SOFT TISSUE AS NEEDED
Status: DISCONTINUED | OUTPATIENT
Start: 2024-02-22 | End: 2024-02-22 | Stop reason: SURG

## 2024-02-22 RX ORDER — MAGNESIUM HYDROXIDE 1200 MG/15ML
LIQUID ORAL AS NEEDED
Status: DISCONTINUED | OUTPATIENT
Start: 2024-02-22 | End: 2024-02-22 | Stop reason: HOSPADM

## 2024-02-22 RX ORDER — ROCURONIUM BROMIDE 10 MG/ML
INJECTION, SOLUTION INTRAVENOUS AS NEEDED
Status: DISCONTINUED | OUTPATIENT
Start: 2024-02-22 | End: 2024-02-22 | Stop reason: SURG

## 2024-02-22 RX ORDER — ULTRASOUND COUPLING MEDIUM
GEL (GRAM) TOPICAL AS NEEDED
Status: DISCONTINUED | OUTPATIENT
Start: 2024-02-22 | End: 2024-02-22 | Stop reason: HOSPADM

## 2024-02-22 RX ORDER — HYDROCODONE BITARTRATE AND ACETAMINOPHEN 5; 325 MG/1; MG/1
1 TABLET ORAL EVERY 4 HOURS PRN
Qty: 15 TABLET | Refills: 0 | Status: SHIPPED | OUTPATIENT
Start: 2024-02-22

## 2024-02-22 RX ORDER — AMOXICILLIN AND CLAVULANATE POTASSIUM 875; 125 MG/1; MG/1
1 TABLET, FILM COATED ORAL 2 TIMES DAILY
Qty: 14 TABLET | Refills: 0 | Status: SHIPPED | OUTPATIENT
Start: 2024-02-22 | End: 2024-02-29

## 2024-02-22 RX ORDER — OXYCODONE HYDROCHLORIDE 5 MG/1
5 TABLET ORAL
Status: DISCONTINUED | OUTPATIENT
Start: 2024-02-22 | End: 2024-02-22 | Stop reason: HOSPADM

## 2024-02-22 RX ORDER — LIDOCAINE HYDROCHLORIDE 20 MG/ML
INJECTION, SOLUTION EPIDURAL; INFILTRATION; INTRACAUDAL; PERINEURAL AS NEEDED
Status: DISCONTINUED | OUTPATIENT
Start: 2024-02-22 | End: 2024-02-22 | Stop reason: SURG

## 2024-02-22 RX ORDER — ACETAMINOPHEN 500 MG
500 TABLET ORAL ONCE
Status: COMPLETED | OUTPATIENT
Start: 2024-02-22 | End: 2024-02-22

## 2024-02-22 RX ORDER — DIPHENHYDRAMINE HCL 25 MG
25 CAPSULE ORAL EVERY 6 HOURS PRN
COMMUNITY

## 2024-02-22 RX ORDER — PROPOFOL 10 MG/ML
INJECTION, EMULSION INTRAVENOUS AS NEEDED
Status: DISCONTINUED | OUTPATIENT
Start: 2024-02-22 | End: 2024-02-22 | Stop reason: SURG

## 2024-02-22 RX ORDER — SODIUM CHLORIDE, SODIUM LACTATE, POTASSIUM CHLORIDE, CALCIUM CHLORIDE 600; 310; 30; 20 MG/100ML; MG/100ML; MG/100ML; MG/100ML
9 INJECTION, SOLUTION INTRAVENOUS CONTINUOUS PRN
Status: DISCONTINUED | OUTPATIENT
Start: 2024-02-22 | End: 2024-02-22 | Stop reason: HOSPADM

## 2024-02-22 RX ORDER — MEPERIDINE HYDROCHLORIDE 25 MG/ML
12.5 INJECTION INTRAMUSCULAR; INTRAVENOUS; SUBCUTANEOUS
Status: DISCONTINUED | OUTPATIENT
Start: 2024-02-22 | End: 2024-02-22 | Stop reason: HOSPADM

## 2024-02-22 RX ORDER — LIDOCAINE HYDROCHLORIDE 20 MG/ML
JELLY TOPICAL AS NEEDED
Status: DISCONTINUED | OUTPATIENT
Start: 2024-02-22 | End: 2024-02-22 | Stop reason: HOSPADM

## 2024-02-22 RX ORDER — LANOLIN ALCOHOL/MO/W.PET/CERES
1000 CREAM (GRAM) TOPICAL DAILY
COMMUNITY

## 2024-02-22 RX ADMIN — DEXAMETHASONE SODIUM PHOSPHATE 4 MG: 4 INJECTION, SOLUTION INTRAMUSCULAR; INTRAVENOUS at 07:42

## 2024-02-22 RX ADMIN — SODIUM CHLORIDE, POTASSIUM CHLORIDE, SODIUM LACTATE AND CALCIUM CHLORIDE 9 ML/HR: 600; 310; 30; 20 INJECTION, SOLUTION INTRAVENOUS at 07:30

## 2024-02-22 RX ADMIN — MIDAZOLAM HYDROCHLORIDE 2 MG: 1 INJECTION, SOLUTION INTRAMUSCULAR; INTRAVENOUS at 07:30

## 2024-02-22 RX ADMIN — ROCURONIUM BROMIDE 50 MG: 10 INJECTION, SOLUTION INTRAVENOUS at 07:42

## 2024-02-22 RX ADMIN — SODIUM CHLORIDE, POTASSIUM CHLORIDE, SODIUM LACTATE AND CALCIUM CHLORIDE: 600; 310; 30; 20 INJECTION, SOLUTION INTRAVENOUS at 07:42

## 2024-02-22 RX ADMIN — ACETAMINOPHEN 500 MG: 500 TABLET ORAL at 07:30

## 2024-02-22 RX ADMIN — HYDROMORPHONE HYDROCHLORIDE 0.5 MG: 1 INJECTION, SOLUTION INTRAMUSCULAR; INTRAVENOUS; SUBCUTANEOUS at 08:42

## 2024-02-22 RX ADMIN — OXYCODONE 5 MG: 5 TABLET ORAL at 09:49

## 2024-02-22 RX ADMIN — FENTANYL CITRATE 100 MCG: 50 INJECTION, SOLUTION INTRAMUSCULAR; INTRAVENOUS at 07:42

## 2024-02-22 RX ADMIN — PROPOFOL 150 MG: 10 INJECTION, EMULSION INTRAVENOUS at 07:42

## 2024-02-22 RX ADMIN — LIDOCAINE HYDROCHLORIDE 100 MG: 20 INJECTION, SOLUTION EPIDURAL; INFILTRATION; INTRACAUDAL; PERINEURAL at 07:42

## 2024-02-22 RX ADMIN — SUGAMMADEX 200 MG: 100 INJECTION, SOLUTION INTRAVENOUS at 08:55

## 2024-02-22 RX ADMIN — Medication 2000 MG: at 07:47

## 2024-02-22 RX ADMIN — ONDANSETRON 4 MG: 2 INJECTION INTRAMUSCULAR; INTRAVENOUS at 08:23

## 2024-02-22 NOTE — H&P
Chief Complaint:  Hemorrhoids    Primary Care Provider: Bryan Shafer MD    Referring Provider: Bryan Shafer MD    History of Present Illness  Patient is here for hemorrhoid surgery.  Following is a copy of the HPI from the surgery clinic office note.    Medhat Stevens is a 58 y.o. male referred by Bryan Shafer MD for hemorrhoids.  Patient says he has had per hemorrhoids past few years.  Patient takes narcotic pain medications for chronic back pain.  He said he is trying to do everything he can to deal with the hemorrhoids but has been unsuccessful.  He has a bleeding on and off and stool seepage.  He said he has been dealing for the hemorrhoids for a long period of time and is hoping something can be done to help improve his problem.  Patient had a colonoscopy time in the past few months and then no concerning findings.    Allergies: Patient has no known allergies.    Outpatient Medications Marked as Taking for the 1/19/24 encounter (Office Visit) with Tesfaye Saez MD   Medication Sig Dispense Refill    butalbital-acetaminophen-caffeine (FIORICET, ESGIC) -40 MG per tablet Every 4 (Four) Hours.      famotidine (Pepcid) 40 MG tablet Take 1 tablet by mouth every night at bedtime. 90 tablet 1    HYDROcodone-acetaminophen (NORCO)  MG per tablet 1 tab(s) orally every 8  hours for 28 days      Hydrocort-Pramoxine, Perianal, (Proctofoam HC) 1-1 % rectal foam Insert 1 application  into the rectum 2 (Two) Times a Day. 15 g 1    topiramate (TOPAMAX) 25 MG tablet       topiramate (TOPAMAX) 50 MG tablet Every 12 (Twelve) Hours.      Vitamin D, Cholecalciferol, (CHOLECALCIFEROL) 10 MCG (400 UNIT) tablet Take 1 tablet by mouth Daily.         Past Medical History:    GERD (gastroesophageal reflux disease)    Hemorrhoids    Irritable bowel syndrome    Rectal bleeding        Past Surgical History:    APPENDECTOMY    COLONOSCOPY    COLONOSCOPY    Procedure: COLONOSCOPY WITH POLYPECTOMY;  Surgeon: Bryan Ji  MD Juan David;  Location: [facility] ENDOSCOPY;  Service: Gastroenterology;  Laterality: N/A;  COLON POLYP, HEMORRHOIDS       Family History:   Family History   Problem Relation Age of Onset    Colon cancer Neg Hx         Social History:  Social History     Tobacco Use    Smoking status: Never     Passive exposure: Never    Smokeless tobacco: Never   Substance Use Topics    Alcohol use: Yes     Comment: 1 week       Objective     Vital Signs:  Available in the EMR  Respiratory:  breathing not labored, respiratory effort appears normal  Cardiovascular:  heart regular rate  Musculoskeletal: moving all extremities symmetrically and purposefully  Neurologic:  no obvious motor or sensory deficits  Psychiatric:  judgment and insight intact  Anal: See my prior office note    Assessment:  Hemorrhoids    Plan:  Surgical hemorrhoidectomy    Discussion: Indications, options, risks, benefits, and expected outcomes of planned surgery were discussed with the patient and he agrees to proceed.    Tesfaye Saez MD  02/22/2024    Electronically signed by Tesfaye Saez MD, 02/22/24, 7:06 AM EST.

## 2024-02-22 NOTE — DISCHARGE INSTRUCTIONS
Dr. Saez's Instructions       Following your hemorrhoidectomy, you will have discomfort at your rectal/anus area. You may also experience constipation, difficulty urinating, and possibly some rectal bleeding.  Any or all of these are possible.  Following are some general guidelines for proper care after your procedure.     -Carefully remove the bandage on the day after surgery. You may shower. Good hygiene is essential for proper healing. It is important for you to take a sitz bath (sit for 5 to 10 minutes in warm bath water) one or two times daily AND after each time you have a bowel movement.  Do this for the next 3 weeks. Wearing soft gauze pads or sanitary napkins in your underwear helps control fluid drainage, discharge of mucous, and bleeding.  Change the pads and underwear frequently.  The use of dry toilet tissue should be avoided - use wet wipes or Tuck´s pads to clean yourself.     -Maintain a liquid diet until Tuesday, February 27.  You can resume your regular diet on February 27.     -Take a stool softener named Colace (also called Docusate) for three weeks after your surgery.  Take two tablets in the morning and two in the evening.  Also, take Benefiber or other psyllium product (Metamucil, Citrucel, etc) one teaspoon twice a day.   If you have not had a bowel movement by the morning of the fourth day following surgery, drink one bottle of magnesium citrate, which can be purchased at any pharmacy.  If your stools become too loose after following these instructions then stop using the stool softener and fiber supplements for a day and then resume them.     -You will receive a narcotic pain medicine prescription.  Take the narcotic pain medicine as prescribed.  Also, take ibuprofen or Tylenol to help the efforts of your narcotic pain medication.  To help avoid having an upset stomach, take the narcotic pain medication with food in your stomach.    -You will be prescribed an antibiotic named Augmentin.   Take it as prescribed for 7 days.     -Do not drive for at least 3 days after your surgery.     -Call Dr. Camacho office & schedule a follow-up appointment for about three weeks after your surgery.     -You are excused from work for 6 weeks but may return to work anytime after 2 weeks should you feel able to do so.     -Call Dr. Camacho office if have any of the following symptoms:   -Pain not controlled by your pain medication.  -Constipation not relieved by the stool softener/laxatives.  -Increasing bleeding from your incision.              DISCHARGE INSTRUCTIONS  SURGICAL / AMBULATORY  PROCEDURES      For your surgery you had:  General anesthesia (you may have a sore throat for the first 24 hours)   You may experience dizziness, drowsiness, or light-headedness for several hours following surgery/procedure.  Do not stay alone today or tonight.  Limit your activity for 24 hours.  Resume your diet slowly.  Follow whatever special dietary instructions you may have been given by your doctor.  You should not drive or operate machinery, drink alcohol, or sign legally binding documents for 24 hours or while you are taking pain medication.    NOTIFY YOUR DOCTOR IF YOU EXPERIENCE ANY OF THE FOLLOWING:  Temperature greater than 101 degrees Fahrenheit  Shaking Chills  Redness or excessive drainage from incision  Nausea, vomiting and/or pain that is not controlled by prescribed medications  Increase in bleeding or bleeding that is excessive  Unable to urinate in 6 hours after surgery  If unable to reach your doctor, please go to the closest Emergency Room  It is important to avoid constipation at this time so the physician will prescribe stool softeners. Eating a high-fiber diet and drinking plenty of liquids also helps. A small to moderate amount of bleeding, usually when having a bowel movement, may occur for a week or two following the surgery. This is normal and should stop when the anus and rectum heal.  Heavy lifting  should be avoided for 2-3 weeks.  An ice pack can help reduce swelling. Soaking in a sitz bat (a shallow bath of warm water) several times a day helps ease the discomfort. Using a donut ring (cushion with a hole in the middle) can make sitting upright more comfortable.  May shower tomorrow, sitz bath as needed.      Last dose of pain medication was given at:  TYLENOL 500 MG AT 7:30 AM,  OXYCODONE 5 MG AT 9:49 AM .

## 2024-02-22 NOTE — ANESTHESIA PREPROCEDURE EVALUATION
Anesthesia Evaluation     Patient summary reviewed and Nursing notes reviewed   no history of anesthetic complications:   NPO Solid Status: > 8 hours  NPO Liquid Status: > 2 hours           Airway   Mallampati: II  TM distance: >3 FB  Neck ROM: limited  No difficulty expected and Large neck circumference  Comment: Significant limited ROM neck s/p fusion  Dental      Pulmonary - normal exam    breath sounds clear to auscultation  (+) ,sleep apnea  Cardiovascular - negative cardio ROS and normal exam  Exercise tolerance: good (4-7 METS)    Rhythm: regular  Rate: normal        Neuro/Psych  (+) headaches  GI/Hepatic/Renal/Endo    (+) GERD well controlled, GI bleeding resolved    Musculoskeletal     (+) neck pain  Abdominal    Substance History - negative use     OB/GYN negative ob/gyn ROS         Other - negative ROS       ROS/Med Hx Other: >4METS, ACTIVE. HX CERVICAL SPINE,LUMBAR SPINE FUSION. HX C/P 2022 WITH RECOM. FOR ECHO/STRESS. NOT COMPLETED. PT DENIES FURTHER ISSUES,C/P, SOA. FOLLOWS PCP, LAST OV 1/9/24. KT               Anesthesia Plan    ASA 3     general     (Patient understands anesthesia not responsible for dental damage.  Use video DL due to neck fusion)  intravenous induction     Anesthetic plan, risks, benefits, and alternatives have been provided, discussed and informed consent has been obtained with: patient.  Pre-procedure education provided  Plan discussed with CRNA.    CODE STATUS:

## 2024-02-22 NOTE — OP NOTE
Operative Report    Patient Name:  Medhat Stevens  YOB: 1965    Date of Surgery:  2/22/2024     Pre-op Diagnosis:   Hemorrhoids, unspecified hemorrhoid type [K64.9]       Post-op Diagnosis:   Post-Op Diagnosis Codes:     * Hemorrhoids, unspecified hemorrhoid type [K64.9]    Procedure:  HEMORRHOIDECTOMY - 3 column    Staff:  Surgeon(s):  Tesfaye Saez MD    Assistant: London Bryson CSA    Anesthesia: General    Estimated Blood Loss: 20 mL    Complications:  None    Drains:  None    Packing:  None    Implants:    Implant Name Type Inv. Item Serial No.  Lot No. LRB No. Used Action   HEMOST ABS SURGIFOAM 8X3CM - [id number] Implant HEMOST ABS SURGIFOAM 8X3CM  [employer] J [id number] N/A 1 Implanted       Specimen:          Specimens       ID Source Type Tests Collected By Collected At Frozen?    A Anus Tissue TISSUE PATHOLOGY EXAM   Tesfaye Saez MD 2/22/24 0809     Description: Hemorrhoid Tissue          Indications:  See my preop H&P note.      Findings: 3 columns of large hemorrhoids were excised.  Each of the columns contain both internal and external hemorrhoids.    Description of Procedure:  The patient was identified in the holding area and brought to the operating room where the patient was placed in the supine position. After induction of general anesthesia, the patient was positioned in stirrups and prepped and draped in the usual sterile fashion. A retractor was placed in the anus. Three columns of large size hemorrhoids with both internal and external components were present.  Attention was focused at the right anterior area and hemorrhoidal tissue grasped with a hemorrhoidal clamp.  Ligasure was then used to excised an ellipse of tissue that contained the hemorrhoids.  The excised tissue was sent to pathology.  Beginning at the apex proximally, a running 2-0 vicryl suture was used to close the defect leaving the end-point epidermis open.  A mirror procedure was  then performed at the right posterior area and the left lateral area, completing a 3-column hemorrhoidectomy.  At the conclusion, there was no evidence of bleeding.  30 ml of lidocaine was injected.  A gauze plug soaked in topical lidocaine was then placed into the anus.  Appropriate dressing were placed.  The patient was then awakened and taken to the recover area in satisfactory condition.    Assistant: London Bryson CSA  was responsible for performing the following activities: Retraction, Suction and Placing Dressing and his skilled assistance was necessary for the success of this case.  Assistant: London Bryson CSA  was responsible for performing the following activities:       Tesfaye Saez MD     Date: 2/22/2024  Time: 09:11 EST    Electronically signed by Tesfaye Saez MD, 02/22/24, 9:11 AM EST.

## 2024-02-22 NOTE — ANESTHESIA POSTPROCEDURE EVALUATION
Patient: Medhat Stevens    Procedure Summary       Date: 02/22/24 Room / Location: [facility] OR 04 / [facility] MAIN OR    Anesthesia Start: 0737 Anesthesia Stop: 0907    Procedure: HEMORRHOIDECTOMY (Anus) Diagnosis:       Hemorrhoids, unspecified hemorrhoid type      (Hemorrhoids, unspecified hemorrhoid type [K64.9])    Surgeons: Tesfaye Saez MD Provider: Vonnie Neal MD    Anesthesia Type: general ASA Status: 3            Anesthesia Type: general    Vitals  Vitals Value Taken Time   /87 02/22/24 0931   Temp 36.3 °C (97.3 °F) 02/22/24 0904   Pulse 71 02/22/24 0935   Resp 16 02/22/24 0934   SpO2 96 % 02/22/24 0935   Vitals shown include unfiled device data.        Post Anesthesia Care and Evaluation    Patient location during evaluation: bedside  Patient participation: complete - patient participated  Level of consciousness: awake  Pain management: adequate    Airway patency: patent  PONV Status: none  Cardiovascular status: acceptable and stable  Respiratory status: acceptable  Hydration status: acceptable    Comments: An Anesthesiologist personally participated in the most demanding procedures (including induction and emergence if applicable) in the anesthesia plan, monitored the course of anesthesia administration at frequent intervals and remained physically present and available for immediate diagnosis and treatment of emergencies.

## 2024-02-25 LAB
QT INTERVAL: 360 MS
QTC INTERVAL: 427 MS

## 2024-03-12 ENCOUNTER — OFFICE VISIT (OUTPATIENT)
Dept: SURGERY | Facility: CLINIC | Age: 59
End: 2024-03-12
Payer: COMMERCIAL

## 2024-03-12 VITALS
BODY MASS INDEX: 39.02 KG/M2 | HEIGHT: 75 IN | TEMPERATURE: 97.8 F | HEART RATE: 90 BPM | DIASTOLIC BLOOD PRESSURE: 94 MMHG | SYSTOLIC BLOOD PRESSURE: 158 MMHG | WEIGHT: 313.8 LBS

## 2024-03-12 DIAGNOSIS — Z09 ENCOUNTER FOR FOLLOW-UP: Primary | ICD-10-CM

## 2024-03-12 PROCEDURE — 99024 POSTOP FOLLOW-UP VISIT: CPT | Performed by: SURGERY

## 2024-03-12 RX ORDER — BUTALBITAL, ACETAMINOPHEN AND CAFFEINE 50; 325; 40 MG/1; MG/1; MG/1
TABLET ORAL
COMMUNITY
Start: 2023-09-19

## 2024-03-12 RX ORDER — TOPIRAMATE 50 MG/1
TABLET, FILM COATED ORAL
COMMUNITY
Start: 2024-03-05

## 2024-03-12 NOTE — PROGRESS NOTES
Patient is here for follow-up after 3 column hemorrhoidectomy on 2/22/2024.  Patient says he had quite a bit of pain up until about the last week and the pain has subsided quite a bit.  He has no complaints or concerns today.  On exam, the hemorrhoidectomy sites are healing well.  I removed some of the sutures that have not dissolved yet.    I assessment is the patient is recovering satisfactorily after hemorrhoidectomy.  No new issues to address today.  Patient seems pleased with outcome thus far.  I will have him see me again in about 3 weeks.

## 2024-03-12 NOTE — LETTER
March 12, 2024     Patient: Medhat Stevens   YOB: 1965   Date of Visit: 3/12/2024       To Whom It May Concern:    Medhat Stevens is able to return to work on 3-18-24 without restrictions.            Sincerely,        Tesfaye Saez MD

## 2024-04-04 NOTE — PROGRESS NOTES
Patient is here for another follow-up after 3 column hemorrhoidectomy on 2/22/2024.  I last saw him on 3/12/2024.  A copy of my note from that day is available below.    Patient continues to do well.  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  He still has a mild amount of drainage but the drainage is most completely went down to nothing and is quite a bit less than from when I last saw him.    On exam, the anus looks fine and the incisions of all healed well.    Assessment is the patient is recovering satisfactorily after hemorrhoidectomy.  No new issues to address.  He will see me PRN.    Copy of Note from 3/12/24  Patient is here for follow-up after 3 column hemorrhoidectomy on 2/22/2024.  Patient says he had quite a bit of pain up until about the last week and the pain has subsided quite a bit.  He has no complaints or concerns today.  On exam, the hemorrhoidectomy sites are healing well.  I removed some of the sutures that have not dissolved yet.     I assessment is the patient is recovering satisfactorily after hemorrhoidectomy.  No new issues to address today.  Patient seems pleased with outcome thus far.  I will have him see me again in about 3 weeks.

## 2024-04-05 ENCOUNTER — OFFICE VISIT (OUTPATIENT)
Dept: SURGERY | Facility: CLINIC | Age: 59
End: 2024-04-05
Payer: COMMERCIAL

## 2024-04-05 VITALS — HEIGHT: 75 IN | BODY MASS INDEX: 39.17 KG/M2 | WEIGHT: 315 LBS

## 2024-04-05 DIAGNOSIS — Z09 ENCOUNTER FOR FOLLOW-UP: Primary | ICD-10-CM

## 2024-04-05 PROCEDURE — 99024 POSTOP FOLLOW-UP VISIT: CPT | Performed by: SURGERY

## 2024-04-05 RX ORDER — DICLOFENAC SODIUM 30 MG/G
GEL TOPICAL
COMMUNITY
Start: 2024-04-03

## 2025-08-08 ENCOUNTER — HOSPITAL ENCOUNTER (EMERGENCY)
Facility: HOSPITAL | Age: 60
Discharge: HOME | End: 2025-08-08
Attending: EMERGENCY MEDICINE
Payer: COMMERCIAL

## 2025-08-08 ENCOUNTER — APPOINTMENT (OUTPATIENT)
Dept: CT IMAGING | Facility: HOSPITAL | Age: 60
End: 2025-08-08
Payer: COMMERCIAL

## 2025-08-08 VITALS
DIASTOLIC BLOOD PRESSURE: 102 MMHG | RESPIRATION RATE: 18 BRPM | SYSTOLIC BLOOD PRESSURE: 149 MMHG | WEIGHT: 315 LBS | HEART RATE: 73 BPM | HEIGHT: 75 IN | BODY MASS INDEX: 39.17 KG/M2 | TEMPERATURE: 97.8 F | OXYGEN SATURATION: 99 %

## 2025-08-08 DIAGNOSIS — R07.89 RIGHT-SIDED CHEST WALL PAIN: Primary | ICD-10-CM

## 2025-08-08 LAB
ALBUMIN SERPL-MCNC: 4.6 G/DL (ref 3.5–5.2)
ALBUMIN/GLOB SERPL: 1.5 G/DL
ALP SERPL-CCNC: 69 U/L (ref 39–117)
ALT SERPL W P-5'-P-CCNC: 29 U/L (ref 1–41)
ANION GAP SERPL CALCULATED.3IONS-SCNC: 10.4 MMOL/L (ref 5–15)
AST SERPL-CCNC: 28 U/L (ref 1–40)
BASOPHILS # BLD AUTO: 0.07 10*3/MM3 (ref 0–0.2)
BASOPHILS NFR BLD AUTO: 1.5 % (ref 0–1.5)
BILIRUB SERPL-MCNC: 0.3 MG/DL (ref 0–1.2)
BILIRUB UR QL STRIP: NEGATIVE
BUN SERPL-MCNC: 15.5 MG/DL (ref 6–20)
BUN/CREAT SERPL: 15.3 (ref 7–25)
CALCIUM SPEC-SCNC: 9.2 MG/DL (ref 8.6–10.5)
CHLORIDE SERPL-SCNC: 102 MMOL/L (ref 98–107)
CLARITY UR: CLEAR
CO2 SERPL-SCNC: 24.6 MMOL/L (ref 22–29)
COLOR UR: YELLOW
CREAT SERPL-MCNC: 1.01 MG/DL (ref 0.76–1.27)
D-LACTATE SERPL-SCNC: 0.6 MMOL/L (ref 0.5–2)
DEPRECATED RDW RBC AUTO: 45 FL (ref 37–54)
EGFRCR SERPLBLD CKD-EPI 2021: 85.7 ML/MIN/1.73
EOSINOPHIL # BLD AUTO: 0.2 10*3/MM3 (ref 0–0.4)
EOSINOPHIL NFR BLD AUTO: 4.3 % (ref 0.3–6.2)
ERYTHROCYTE [DISTWIDTH] IN BLOOD BY AUTOMATED COUNT: 14.8 % (ref 12.3–15.4)
GLOBULIN UR ELPH-MCNC: 3 GM/DL
GLUCOSE SERPL-MCNC: 96 MG/DL (ref 65–99)
GLUCOSE UR STRIP-MCNC: NEGATIVE MG/DL
HCT VFR BLD AUTO: 43.1 % (ref 37.5–51)
HGB BLD-MCNC: 13.4 G/DL (ref 13–17.7)
HGB UR QL STRIP.AUTO: NEGATIVE
HOLD SPECIMEN: NORMAL
HOLD SPECIMEN: NORMAL
IMM GRANULOCYTES # BLD AUTO: 0.01 10*3/MM3 (ref 0–0.05)
IMM GRANULOCYTES NFR BLD AUTO: 0.2 % (ref 0–0.5)
KETONES UR QL STRIP: NEGATIVE
LEUKOCYTE ESTERASE UR QL STRIP.AUTO: NEGATIVE
LIPASE SERPL-CCNC: 30 U/L (ref 13–60)
LYMPHOCYTES # BLD AUTO: 1.14 10*3/MM3 (ref 0.7–3.1)
LYMPHOCYTES NFR BLD AUTO: 24.7 % (ref 19.6–45.3)
MCH RBC QN AUTO: 25.9 PG (ref 26.6–33)
MCHC RBC AUTO-ENTMCNC: 31.1 G/DL (ref 31.5–35.7)
MCV RBC AUTO: 83.4 FL (ref 79–97)
MONOCYTES # BLD AUTO: 0.4 10*3/MM3 (ref 0.1–0.9)
MONOCYTES NFR BLD AUTO: 8.7 % (ref 5–12)
NEUTROPHILS NFR BLD AUTO: 2.8 10*3/MM3 (ref 1.7–7)
NEUTROPHILS NFR BLD AUTO: 60.6 % (ref 42.7–76)
NITRITE UR QL STRIP: NEGATIVE
NRBC BLD AUTO-RTO: 0 /100 WBC (ref 0–0.2)
NT-PROBNP SERPL-MCNC: <36 PG/ML (ref 0–900)
PH UR STRIP.AUTO: 6.5 [PH] (ref 5–8)
PLATELET # BLD AUTO: 309 10*3/MM3 (ref 140–450)
PMV BLD AUTO: 11.5 FL (ref 6–12)
POTASSIUM SERPL-SCNC: 4.1 MMOL/L (ref 3.5–5.2)
PROT SERPL-MCNC: 7.6 G/DL (ref 6–8.5)
PROT UR QL STRIP: NEGATIVE
QT INTERVAL: 360 MS
QTC INTERVAL: 420 MS
RBC # BLD AUTO: 5.17 10*6/MM3 (ref 4.14–5.8)
SODIUM SERPL-SCNC: 137 MMOL/L (ref 136–145)
SP GR UR STRIP: 1.02 (ref 1–1.03)
TROPONIN T SERPL HS-MCNC: 11 NG/L
UROBILINOGEN UR QL STRIP: NORMAL
WBC NRBC COR # BLD AUTO: 4.62 10*3/MM3 (ref 3.4–10.8)
WHOLE BLOOD HOLD COAG: NORMAL
WHOLE BLOOD HOLD SPECIMEN: NORMAL

## 2025-08-08 PROCEDURE — 84484 ASSAY OF TROPONIN QUANT: CPT | Performed by: EMERGENCY MEDICINE

## 2025-08-08 PROCEDURE — 83690 ASSAY OF LIPASE: CPT | Performed by: EMERGENCY MEDICINE

## 2025-08-08 PROCEDURE — 85025 COMPLETE CBC W/AUTO DIFF WBC: CPT | Performed by: EMERGENCY MEDICINE

## 2025-08-08 PROCEDURE — 87086 URINE CULTURE/COLONY COUNT: CPT

## 2025-08-08 PROCEDURE — 99285 EMERGENCY DEPT VISIT HI MDM: CPT

## 2025-08-08 PROCEDURE — 93005 ELECTROCARDIOGRAM TRACING: CPT | Performed by: EMERGENCY MEDICINE

## 2025-08-08 PROCEDURE — 81003 URINALYSIS AUTO W/O SCOPE: CPT | Performed by: EMERGENCY MEDICINE

## 2025-08-08 PROCEDURE — 96374 THER/PROPH/DIAG INJ IV PUSH: CPT

## 2025-08-08 PROCEDURE — 83605 ASSAY OF LACTIC ACID: CPT | Performed by: EMERGENCY MEDICINE

## 2025-08-08 PROCEDURE — 25510000001 IOPAMIDOL PER 1 ML: Performed by: EMERGENCY MEDICINE

## 2025-08-08 PROCEDURE — 36415 COLL VENOUS BLD VENIPUNCTURE: CPT

## 2025-08-08 PROCEDURE — 83880 ASSAY OF NATRIURETIC PEPTIDE: CPT | Performed by: EMERGENCY MEDICINE

## 2025-08-08 PROCEDURE — 25010000002 KETOROLAC TROMETHAMINE PER 15 MG: Performed by: EMERGENCY MEDICINE

## 2025-08-08 PROCEDURE — 74177 CT ABD & PELVIS W/CONTRAST: CPT

## 2025-08-08 PROCEDURE — 71275 CT ANGIOGRAPHY CHEST: CPT

## 2025-08-08 PROCEDURE — 93010 ELECTROCARDIOGRAM REPORT: CPT | Performed by: INTERNAL MEDICINE

## 2025-08-08 PROCEDURE — 80053 COMPREHEN METABOLIC PANEL: CPT | Performed by: EMERGENCY MEDICINE

## 2025-08-08 RX ORDER — SODIUM CHLORIDE 0.9 % (FLUSH) 0.9 %
10 SYRINGE (ML) INJECTION AS NEEDED
Status: DISCONTINUED | OUTPATIENT
Start: 2025-08-08 | End: 2025-08-08 | Stop reason: HOSPADM

## 2025-08-08 RX ORDER — LIDOCAINE 50 MG/G
1 PATCH TOPICAL EVERY 24 HOURS
Qty: 5 EACH | Refills: 0 | Status: SHIPPED | OUTPATIENT
Start: 2025-08-08

## 2025-08-08 RX ORDER — IOPAMIDOL 755 MG/ML
100 INJECTION, SOLUTION INTRAVASCULAR
Status: COMPLETED | OUTPATIENT
Start: 2025-08-08 | End: 2025-08-08

## 2025-08-08 RX ORDER — KETOROLAC TROMETHAMINE 15 MG/ML
15 INJECTION, SOLUTION INTRAMUSCULAR; INTRAVENOUS ONCE
Status: COMPLETED | OUTPATIENT
Start: 2025-08-08 | End: 2025-08-08

## 2025-08-08 RX ADMIN — KETOROLAC TROMETHAMINE 15 MG: 15 INJECTION, SOLUTION INTRAMUSCULAR; INTRAVENOUS at 16:14

## 2025-08-08 RX ADMIN — IOPAMIDOL 91 ML: 755 INJECTION, SOLUTION INTRAVENOUS at 14:40

## (undated) DEVICE — DEV OPN LIGASURE SM/JAW 28D 16.5MM 18.8CM 1P/U

## (undated) DEVICE — DRAPE,UNDERBUTTOCKS,PCH,STERILE: Brand: MEDLINE

## (undated) DEVICE — SLV SCD KN/LEN ADJ EXPRSS BLENDED MD 1P/U

## (undated) DEVICE — SOLIDIFIER LIQLOC PLS 1500CC BT

## (undated) DEVICE — Device: Brand: DEFENDO AIR/WATER/SUCTION AND BIOPSY VALVE

## (undated) DEVICE — LEGGINGS, PAIR, CLEAR, STERILE: Brand: MEDLINE

## (undated) DEVICE — GLV SURG BIOGEL LTX PF 7 1/2

## (undated) DEVICE — PENCL E/S SMOKEEVAC W/TELESCP CANN

## (undated) DEVICE — GOWN,REINFORCE,POLY,SIRUS,BREATH SLV,XLG: Brand: MEDLINE

## (undated) DEVICE — SOL IRR NACL 0.9PCT BT 1000ML

## (undated) DEVICE — Device

## (undated) DEVICE — STRAP STIRUP SLP RNG 19X3.5IN DISP

## (undated) DEVICE — SUT VIC 2/0 SH 27IN

## (undated) DEVICE — SNAR E/S POLYP SNAREMASTER OVL/10MM 2.8X2300MM YEL

## (undated) DEVICE — MINOR-LF: Brand: MEDLINE INDUSTRIES, INC.

## (undated) DEVICE — SOL IRRG H2O PL/BG 1000ML STRL

## (undated) DEVICE — INTENDED FOR TISSUE SEPARATION, AND OTHER PROCEDURES THAT REQUIRE A SHARP SURGICAL BLADE TO PUNCTURE OR CUT.: Brand: BARD-PARKER ® STAINLESS STEEL BLADES

## (undated) DEVICE — THE SINGLE USE ETRAP – POLYP TRAP IS USED FOR SUCTION RETRIEVAL OF ENDOSCOPICALLY REMOVED POLYPS.: Brand: ETRAP

## (undated) DEVICE — PAD GRND REM POLYHESIVE A/ DISP

## (undated) DEVICE — VAGINAL PREP TRAY: Brand: MEDLINE INDUSTRIES, INC.